# Patient Record
Sex: FEMALE | URBAN - METROPOLITAN AREA
[De-identification: names, ages, dates, MRNs, and addresses within clinical notes are randomized per-mention and may not be internally consistent; named-entity substitution may affect disease eponyms.]

---

## 2020-12-18 ENCOUNTER — OFFICE VISIT (OUTPATIENT)
Dept: URBAN - METROPOLITAN AREA CLINIC 80 | Facility: CLINIC | Age: 55
End: 2020-12-18
Payer: COMMERCIAL

## 2020-12-18 ENCOUNTER — DASHBOARD ENCOUNTERS (OUTPATIENT)
Age: 55
End: 2020-12-18

## 2020-12-18 DIAGNOSIS — R10.32 LEFT LOWER QUADRANT PAIN: ICD-10-CM

## 2020-12-18 PROCEDURE — G9903 PT SCRN TBCO ID AS NON USER: HCPCS | Performed by: INTERNAL MEDICINE

## 2020-12-18 PROCEDURE — G8427 DOCREV CUR MEDS BY ELIG CLIN: HCPCS | Performed by: INTERNAL MEDICINE

## 2020-12-18 PROCEDURE — G8417 CALC BMI ABV UP PARAM F/U: HCPCS | Performed by: INTERNAL MEDICINE

## 2020-12-18 PROCEDURE — G8482 FLU IMMUNIZE ORDER/ADMIN: HCPCS | Performed by: INTERNAL MEDICINE

## 2020-12-18 PROCEDURE — 99204 OFFICE O/P NEW MOD 45 MIN: CPT | Performed by: INTERNAL MEDICINE

## 2020-12-18 RX ORDER — LEVOTHYROXINE SODIUM 137 UG/1
1 TABLET IN THE MORNING ON AN EMPTY STOMACH TABLET ORAL ONCE A DAY
Status: ACTIVE | COMMUNITY

## 2020-12-18 RX ORDER — METFORMIN HYDROCHLORIDE 1000 MG/1
1 TABLET WITH A MEAL TABLET, FILM COATED ORAL ONCE A DAY
Status: ACTIVE | COMMUNITY

## 2020-12-18 RX ORDER — SITAGLIPTIN 100 MG/1
1 TABLET TABLET, FILM COATED ORAL ONCE A DAY
Status: ACTIVE | COMMUNITY

## 2020-12-18 RX ORDER — DICYCLOMINE HYDROCHLORIDE 10 MG/1
1 TABLET CAPSULE ORAL
Qty: 20 | Refills: 1 | OUTPATIENT
Start: 2020-12-18

## 2020-12-18 NOTE — HPI-TODAY'S VISIT:
She comes in today to discuss one year of left lower quadrant pain.  She notes the pain is worse with having a bowel movement but she reports rare constipation. She does not relate the pain to meals.  She denies weight loss. She did have a GYN surgery for the pain but reports it did not help.  She denies bleeding.  She has never had a colonoscopy.

## 2020-12-18 NOTE — PHYSICAL EXAM GASTROINTESTINAL
Abdomen , soft, LLQ TTP nondistended , no guarding or rigidity , no masses palpable , Liver and Spleen , no hepatomegaly present , no hepatosplenomegaly , liver nontender

## 2020-12-29 ENCOUNTER — LAB OUTSIDE AN ENCOUNTER (OUTPATIENT)
Dept: URBAN - METROPOLITAN AREA CLINIC 80 | Facility: CLINIC | Age: 55
End: 2020-12-29

## 2020-12-29 ENCOUNTER — TELEPHONE ENCOUNTER (OUTPATIENT)
Dept: URBAN - METROPOLITAN AREA CLINIC 80 | Facility: CLINIC | Age: 55
End: 2020-12-29

## 2021-01-03 LAB
CREATININE POC: 0.7
PERFORMING LAB: (no result)